# Patient Record
(demographics unavailable — no encounter records)

---

## 2024-10-31 NOTE — DISCUSSION/SUMMARY
[de-identified] : Patient has a mallet finger.  He is about a month out.  Today I placed him in AlumaFoam splint in extension.  I stressed the importance of him remaining in a splint 24 hours a day and 7 days a week for 6 weeks.  I will see him back in 3 weeks for repeat evaluation.  All questions were answered today.  He will contact the office if he has any questions or concerns.

## 2024-10-31 NOTE — HISTORY OF PRESENT ILLNESS
[de-identified] :  patient is a 27-year-old for evaluation of his left pinky finger.  He states that about a month ago he was going on his honeymoon he was putting He luggage overhead on the airplane when he jammed his finger.  He then went to [place] for 2 weeks and recently got home.  He noticed a lag in his pinky finger since then.

## 2024-10-31 NOTE — PHYSICAL EXAM
[de-identified] :  physical exam of his left little finger: Negative swelling or ecchymosis.  Nontender the MCP, PIP, or DIP joint.  There is an extensor lag of the DIP joint.  He can flex and extend the MCP and IP joint.  He cannot extend at the DIP joint.

## 2024-10-31 NOTE — DATA REVIEWED
[FreeTextEntry1] :  3 x-ray views taken in the office today of his left pinky finger show a mild deformity without any acute displaced fractures or bony abnormalities.